# Patient Record
Sex: MALE | URBAN - METROPOLITAN AREA
[De-identification: names, ages, dates, MRNs, and addresses within clinical notes are randomized per-mention and may not be internally consistent; named-entity substitution may affect disease eponyms.]

---

## 2019-08-14 ENCOUNTER — APPOINTMENT (RX ONLY)
Dept: URBAN - METROPOLITAN AREA CLINIC 58 | Facility: CLINIC | Age: 69
Setting detail: DERMATOLOGY
End: 2019-08-14

## 2019-08-14 DIAGNOSIS — L57.0 ACTINIC KERATOSIS: ICD-10-CM

## 2019-08-14 DIAGNOSIS — D18.0 HEMANGIOMA: ICD-10-CM

## 2019-08-14 DIAGNOSIS — L81.4 OTHER MELANIN HYPERPIGMENTATION: ICD-10-CM

## 2019-08-14 DIAGNOSIS — L82.1 OTHER SEBORRHEIC KERATOSIS: ICD-10-CM

## 2019-08-14 DIAGNOSIS — L82.0 INFLAMED SEBORRHEIC KERATOSIS: ICD-10-CM

## 2019-08-14 DIAGNOSIS — D22 MELANOCYTIC NEVI: ICD-10-CM

## 2019-08-14 DIAGNOSIS — L71.8 OTHER ROSACEA: ICD-10-CM

## 2019-08-14 PROBLEM — D22.5 MELANOCYTIC NEVI OF TRUNK: Status: ACTIVE | Noted: 2019-08-14

## 2019-08-14 PROBLEM — E78.5 HYPERLIPIDEMIA, UNSPECIFIED: Status: ACTIVE | Noted: 2019-08-14

## 2019-08-14 PROBLEM — E13.9 OTHER SPECIFIED DIABETES MELLITUS WITHOUT COMPLICATIONS: Status: ACTIVE | Noted: 2019-08-14

## 2019-08-14 PROBLEM — D48.5 NEOPLASM OF UNCERTAIN BEHAVIOR OF SKIN: Status: ACTIVE | Noted: 2019-08-14

## 2019-08-14 PROBLEM — D18.01 HEMANGIOMA OF SKIN AND SUBCUTANEOUS TISSUE: Status: ACTIVE | Noted: 2019-08-14

## 2019-08-14 PROBLEM — M12.9 ARTHROPATHY, UNSPECIFIED: Status: ACTIVE | Noted: 2019-08-14

## 2019-08-14 PROBLEM — I10 ESSENTIAL (PRIMARY) HYPERTENSION: Status: ACTIVE | Noted: 2019-08-14

## 2019-08-14 PROCEDURE — ? PRESCRIPTION

## 2019-08-14 PROCEDURE — 17000 DESTRUCT PREMALG LESION: CPT | Mod: 59

## 2019-08-14 PROCEDURE — ? COUNSELING

## 2019-08-14 PROCEDURE — 11102 TANGNTL BX SKIN SINGLE LES: CPT

## 2019-08-14 PROCEDURE — 17003 DESTRUCT PREMALG LES 2-14: CPT

## 2019-08-14 PROCEDURE — 99203 OFFICE O/P NEW LOW 30 MIN: CPT | Mod: 25

## 2019-08-14 PROCEDURE — ? BIOPSY BY SHAVE METHOD

## 2019-08-14 PROCEDURE — ? LIQUID NITROGEN

## 2019-08-14 RX ORDER — METRONIDAZOLE 7.5 MG/G
CREAM TOPICAL
Qty: 1 | Refills: 3 | Status: ERX | COMMUNITY
Start: 2019-08-14

## 2019-08-14 RX ADMIN — METRONIDAZOLE: 7.5 CREAM TOPICAL at 14:56

## 2019-08-14 ASSESSMENT — LOCATION DETAILED DESCRIPTION DERM
LOCATION DETAILED: LEFT SUPERIOR PARIETAL SCALP
LOCATION DETAILED: RIGHT CENTRAL FRONTAL SCALP
LOCATION DETAILED: PERIUMBILICAL SKIN
LOCATION DETAILED: RIGHT INFERIOR MEDIAL UPPER BACK
LOCATION DETAILED: LEFT NASAL ALA
LOCATION DETAILED: RIGHT MEDIAL FRONTAL SCALP

## 2019-08-14 ASSESSMENT — LOCATION SIMPLE DESCRIPTION DERM
LOCATION SIMPLE: SCALP
LOCATION SIMPLE: RIGHT SCALP
LOCATION SIMPLE: RIGHT UPPER BACK
LOCATION SIMPLE: ABDOMEN
LOCATION SIMPLE: LEFT NOSE

## 2019-08-14 ASSESSMENT — LOCATION ZONE DERM
LOCATION ZONE: SCALP
LOCATION ZONE: TRUNK
LOCATION ZONE: NOSE

## 2019-08-14 NOTE — PROCEDURE: BIOPSY BY SHAVE METHOD
Dressing: bandage
Anesthesia Volume In Cc (Will Not Render If 0): 1
Render Path Notes In Note?: No
Was A Bandage Applied: Yes
Silver Nitrate Text: The wound bed was treated with silver nitrate after the biopsy was performed.
Curettage Text: The wound bed was treated with curettage after the biopsy was performed.
Hemostasis: Drysol and Monsel's
Billing Type: Third-Party Bill
Lab: [id number]
Detail Level: Detailed
Biopsy Method: Dermablade
Lab Facility: [id number]
Post-Care Instructions: I reviewed with the patient in detail post-care instructions. Patient is to keep the biopsy site dry overnight, and then apply bacitracin twice daily until healed. Patient may apply hydrogen peroxide soaks to remove any crusting.
Cryotherapy Text: The wound bed was treated with cryotherapy after the biopsy was performed.
Wound Care: Petrolatum
Additional Anesthesia Volume In Cc (Will Not Render If 0): 0
Size Of Lesion In Cm: 1.2
Anesthesia Type: 1% lidocaine with 1:200,000 epinephrine
Depth Of Biopsy: dermis
Notification Instructions: Patient will be notified of biopsy results. However, patient instructed to call the office if not contacted within 2 weeks.
Electrodesiccation Text: The wound bed was treated with electrodesiccation after the biopsy was performed.
Electrodesiccation And Curettage Text: The wound bed was treated with electrodesiccation and curettage after the biopsy was performed.
Biopsy Type: H and E
Type Of Destruction Used: Curettage
Consent: Written consent was obtained and risks were reviewed including but not limited to scarring, infection, bleeding, scabbing, incomplete removal, nerve damage and allergy to anesthesia.

## 2019-09-11 ENCOUNTER — APPOINTMENT (RX ONLY)
Dept: URBAN - METROPOLITAN AREA CLINIC 56 | Facility: CLINIC | Age: 69
Setting detail: DERMATOLOGY
End: 2019-09-11

## 2019-09-11 DIAGNOSIS — L57.0 ACTINIC KERATOSIS: ICD-10-CM

## 2019-09-11 PROBLEM — D04.4 CARCINOMA IN SITU OF SKIN OF SCALP AND NECK: Status: ACTIVE | Noted: 2019-09-11

## 2019-09-11 PROCEDURE — ? CRYOSURGICAL DESTRUCTION

## 2019-09-11 PROCEDURE — 17000 DESTRUCT PREMALG LESION: CPT | Mod: 59

## 2019-09-11 PROCEDURE — 17272 DSTR MAL LES S/N/H/F/G 1.1-2: CPT

## 2019-09-11 PROCEDURE — ? LIQUID NITROGEN

## 2019-09-11 PROCEDURE — 17003 DESTRUCT PREMALG LES 2-14: CPT

## 2019-09-11 ASSESSMENT — LOCATION SIMPLE DESCRIPTION DERM
LOCATION SIMPLE: SCALP
LOCATION SIMPLE: RIGHT SCALP
LOCATION SIMPLE: POSTERIOR SCALP

## 2019-09-11 ASSESSMENT — LOCATION ZONE DERM: LOCATION ZONE: SCALP

## 2019-09-11 ASSESSMENT — LOCATION DETAILED DESCRIPTION DERM
LOCATION DETAILED: RIGHT SUPERIOR PARIETAL SCALP
LOCATION DETAILED: POSTERIOR MID-PARIETAL SCALP
LOCATION DETAILED: RIGHT MEDIAL FRONTAL SCALP

## 2019-09-11 NOTE — PROCEDURE: LIQUID NITROGEN
Render Note In Bullet Format When Appropriate: No
Post-Care Instructions: I reviewed with the patient in detail post-care instructions. Patient is to wear sunprotection, and avoid picking at any of the treated lesions. Pt may apply Vaseline to crusted or scabbing areas.
Duration Of Freeze Thaw-Cycle (Seconds): 0
Number Of Freeze-Thaw Cycles: 1 freeze-thaw cycle
Detail Level: Simple
Consent: The patient's consent was obtained including but not limited to risks of crusting, scabbing, blistering, scarring, darker or lighter pigmentary change, recurrence, incomplete removal and infection.

## 2019-09-11 NOTE — PROCEDURE: CRYOSURGICAL DESTRUCTION
Detail Level: Simple
Anesthesia Type: 1% lidocaine with epinephrine
Medication Injected: 5-Fluorouracil
Total Time In Minutes: 3 minutes
Post-Care Instructions: I reviewed with the patient in detail post-care instructions. Patient is to keep the area dry for 48 hours, and not to engage in any heavy lifting, exercise, or swimming for the next 14 days. Should the patient develop any fevers, chills, bleeding, severe pain patient will contact the office immediately.
Render Post-Care In The Note: Yes
Add Intralesional Injection: No
Number Of Freeze-Thaw Cycles: 3 freeze-thaw cycles
Pre-Procedure: The surgical site was antiseptically prepared.
Bill As A Line Item Or As Units: Line Item
Consent was obtained from the patient. The risks and benefits to therapy were discussed in detail. Specifically, the risks of infection, scarring, bleeding, prolonged wound healing, incomplete removal, allergy to anesthesia, nerve injury and recurrence were addressed. Alternatives to liquid nitrogen, such as ED&C, surgical removal, XRT were also discussed.  Prior to the procedure, the treatment site was clearly identified and confirmed by the patient. All components of Universal Protocol/PAUSE Rule completed.
Total Volume (Ccs): 1
Size Of Lesion In Cm: 1.8
Anesthesia Volume In Cc: 0
Additional Information: (Optional): The wound was cleaned, and a dressing was applied.  The patient received detailed post-op instructions.
Concentration (Mg/Ml Or Millions Of Plaque Forming Units/Cc): 0.01

## 2021-08-06 NOTE — PATIENT DISCUSSION
The patient expressed a desire to see through the full range of vision from distance, to middle, to near without glasses. The limitations of advanced lens technology were reviewed and the recommendation was made for the Synergy IOL OU. The patient understands there is no guarantee of glasses free vision and the more complete range of vision from the Synergy lens comes with a trade-off. Side effects include halos around point sources of light at night and failure to adapt in 1 in 500 cases resulting in the need for exchange of the lens. Enhancement, including Lasik, may be necessary to achieve the full uncorrected vision potential. The patient elects Synergy OS, goal of emmetropia.

## 2021-09-01 NOTE — PATIENT DISCUSSION
OK to proceed with IOL OD due to [address] for Sx OD made today with KMS and goal if for Synergy OD, goal of emmetropia.

## 2022-01-12 ENCOUNTER — PREPPED CHART (OUTPATIENT)
Dept: URBAN - METROPOLITAN AREA CLINIC 48 | Facility: CLINIC | Age: 72
End: 2022-01-12

## 2022-02-11 ENCOUNTER — NEW PATIENT (OUTPATIENT)
Dept: URBAN - METROPOLITAN AREA CLINIC 48 | Facility: CLINIC | Age: 72
End: 2022-02-11

## 2022-02-11 DIAGNOSIS — H40.1111: ICD-10-CM

## 2022-02-11 DIAGNOSIS — H25.11: ICD-10-CM

## 2022-02-11 DIAGNOSIS — H43.812: ICD-10-CM

## 2022-02-11 DIAGNOSIS — H35.373: ICD-10-CM

## 2022-02-11 DIAGNOSIS — E11.9: ICD-10-CM

## 2022-02-11 PROCEDURE — 92134 CPTRZ OPH DX IMG PST SGM RTA: CPT

## 2022-02-11 PROCEDURE — 92004 COMPRE OPH EXAM NEW PT 1/>: CPT

## 2022-02-11 PROCEDURE — 76514 ECHO EXAM OF EYE THICKNESS: CPT

## 2022-02-11 PROCEDURE — 92133 CPTRZD OPH DX IMG PST SGM ON: CPT

## 2022-02-11 PROCEDURE — 92015 DETERMINE REFRACTIVE STATE: CPT

## 2022-02-11 ASSESSMENT — TONOMETRY
OD_IOP_MMHG: 16
OS_IOP_MMHG: 16

## 2022-02-11 ASSESSMENT — VISUAL ACUITY
OD_CC: 20/70+-
OS_GLARE: 20/400
OS_CC: 20/25-2
OD_GLARE: >20/400
OU_CC: J7-1

## 2022-02-25 ENCOUNTER — DIAGNOSTICS ONLY (OUTPATIENT)
Dept: URBAN - METROPOLITAN AREA CLINIC 49 | Facility: CLINIC | Age: 72
End: 2022-02-25

## 2022-02-25 DIAGNOSIS — H25.813: ICD-10-CM

## 2022-02-25 PROCEDURE — 92025IOL CORNEAL TOPOGRAPHY PREMIUM IOL

## 2022-02-25 PROCEDURE — 92136 OPHTHALMIC BIOMETRY: CPT

## 2022-02-25 ASSESSMENT — KERATOMETRY
OD_K1POWER_DIOPTERS: 46.43
OD_AXISANGLE2_DEGREES: 051
OD_K2POWER_DIOPTERS: 45.62
OD_AXISANGLE_DEGREES: 141

## 2022-03-22 ENCOUNTER — PRE-OP/H&P (OUTPATIENT)
Dept: URBAN - METROPOLITAN AREA CLINIC 49 | Facility: CLINIC | Age: 72
End: 2022-03-22

## 2022-03-22 DIAGNOSIS — H35.373: ICD-10-CM

## 2022-03-22 DIAGNOSIS — H25.811: ICD-10-CM

## 2022-03-22 DIAGNOSIS — H40.1111: ICD-10-CM

## 2022-03-22 PROCEDURE — 92134 CPTRZ OPH DX IMG PST SGM RTA: CPT

## 2022-03-22 PROCEDURE — PREOP PRE OP VISIT

## 2022-03-22 ASSESSMENT — TONOMETRY
OS_IOP_MMHG: 16
OD_IOP_MMHG: 17

## 2022-03-22 ASSESSMENT — KERATOMETRY
OD_AXISANGLE2_DEGREES: 051
OD_AXISANGLE_DEGREES: 141
OD_K2POWER_DIOPTERS: 45.62
OD_K1POWER_DIOPTERS: 46.43

## 2022-03-22 ASSESSMENT — VISUAL ACUITY
OS_CC: 20/25
OD_CC: 20/80

## 2022-03-22 NOTE — PATIENT DISCUSSION
[employer] SURG[employer] PLANNER - STANDARD IOL/NO FEMTO/MIGS: Phacoemulsification with IOL: Eye: OD|DOS: 3/31/2022|Model: SN60WF|Power: 16. 0|Target: -2.2|MIGS: CANALOPLASTY ONLY|Visc: DUET|[place]: YES|10% Phenylephrine: YES|Epi-shugarcaine: YES|Phaco Setting: DENSE|Notes: PLAN: SN60WF @ -2.2 OD w/ CANALOPLASTY ONLY. HX: MILD, POAG OD; ERM OU; TYPE 2 DIABETES. DILATED PUPIL: 7MM.

## 2022-03-30 ASSESSMENT — KERATOMETRY
OD_K1POWER_DIOPTERS: 46.43
OD_AXISANGLE2_DEGREES: 051
OD_AXISANGLE_DEGREES: 141
OD_K2POWER_DIOPTERS: 45.62

## 2022-03-31 ENCOUNTER — POST-OP (OUTPATIENT)
Dept: URBAN - METROPOLITAN AREA SURGERY 16 | Facility: SURGERY | Age: 72
End: 2022-03-31

## 2022-03-31 ENCOUNTER — SURGERY/PROCEDURE (OUTPATIENT)
Dept: URBAN - METROPOLITAN AREA SURGERY 16 | Facility: SURGERY | Age: 72
End: 2022-03-31

## 2022-03-31 DIAGNOSIS — H25.811: ICD-10-CM

## 2022-03-31 DIAGNOSIS — H40.1111: ICD-10-CM

## 2022-03-31 DIAGNOSIS — Z98.41: ICD-10-CM

## 2022-03-31 DIAGNOSIS — Z96.1: ICD-10-CM

## 2022-03-31 PROCEDURE — 99024 POSTOP FOLLOW-UP VISIT: CPT

## 2022-03-31 PROCEDURE — 66174 TRLUML DIL AQ O/F CAN W/O ST: CPT

## 2022-03-31 PROCEDURE — 66984 XCAPSL CTRC RMVL W/O ECP: CPT

## 2022-03-31 ASSESSMENT — VISUAL ACUITY
OD_SC: 20/400
OD_SC: 20/400

## 2022-03-31 ASSESSMENT — TONOMETRY: OD_IOP_MMHG: 06

## 2022-04-05 ENCOUNTER — POST-OP (OUTPATIENT)
Dept: URBAN - METROPOLITAN AREA CLINIC 49 | Facility: CLINIC | Age: 72
End: 2022-04-05

## 2022-04-05 DIAGNOSIS — Z98.41: ICD-10-CM

## 2022-04-05 DIAGNOSIS — Z96.1: ICD-10-CM

## 2022-04-05 PROCEDURE — 99024 POSTOP FOLLOW-UP VISIT: CPT

## 2022-04-05 ASSESSMENT — VISUAL ACUITY
OD_SC: 20/100
OS_SC: 20/25+2
OD_SC: J1
OD_PH: 20/50

## 2022-04-05 ASSESSMENT — TONOMETRY
OS_IOP_MMHG: 16
OD_IOP_MMHG: 16

## 2022-04-05 ASSESSMENT — KERATOMETRY
OD_AXISANGLE_DEGREES: 141
OD_K2POWER_DIOPTERS: 45.62
OD_AXISANGLE2_DEGREES: 051
OD_K1POWER_DIOPTERS: 46.43

## 2022-05-10 ENCOUNTER — POST-OP (OUTPATIENT)
Dept: URBAN - METROPOLITAN AREA CLINIC 49 | Facility: CLINIC | Age: 72
End: 2022-05-10

## 2022-05-10 DIAGNOSIS — Z98.41: ICD-10-CM

## 2022-05-10 DIAGNOSIS — Z96.1: ICD-10-CM

## 2022-05-10 PROCEDURE — 99024 POSTOP FOLLOW-UP VISIT: CPT

## 2022-05-10 ASSESSMENT — TONOMETRY
OS_IOP_MMHG: 17
OD_IOP_MMHG: 17
OS_IOP_MMHG: 16
OD_IOP_MMHG: 17

## 2022-05-10 ASSESSMENT — VISUAL ACUITY
OD_SC: J1+@16"
OS_SC: 20/200
OD_SC: 20/200
OD_SC: J3@26"

## 2023-06-25 NOTE — PATIENT DISCUSSION
The types of intraocular lenses were reviewed with the patient along with a discussion of their various strengths and weaknesses. Ashu Hood  Internal Medicine  [phone] [place] [address]  Phone: [phone]  Fax: [phone]  Established Patient  Follow Up Time: 1 week